# Patient Record
(demographics unavailable — no encounter records)

---

## 2024-10-16 NOTE — CONSULT LETTER
[Dear  ___] : Dear  [unfilled], [Consult Letter:] : I had the pleasure of evaluating your patient, [unfilled]. [Please see my note below.] : Please see my note below. [Sincerely,] : Sincerely, [DrDeloris  ___] : Dr. CARRASCO [FreeTextEntry3] : Petros Pruitt MD FCCP\par  Pulmonary/Critical Care/Sleep Medicine\par  Department of Internal Medicine\par  \par  [facility] [employer]\par

## 2024-10-16 NOTE — PHYSICAL EXAM
[No Acute Distress] : no acute distress [Normal Oropharynx] : normal oropharynx [Normal Appearance] : normal appearance [No Neck Mass] : no neck mass [Normal Rate/Rhythm] : normal rate/rhythm [Normal S1, S2] : normal s1, s2 [No Murmurs] : no murmurs [No Resp Distress] : no resp distress [Kyphosis] : kyphosis [Benign] : benign [Normal Gait] : normal gait [No Clubbing] : no clubbing [No Cyanosis] : no cyanosis [No Edema] : no edema [FROM] : FROM [Normal Color/ Pigmentation] : normal color/ pigmentation [Oriented x3] : oriented x3 [Normal Affect] : normal affect [TextBox_68] : Few expiratory wheezes with decreased breath sounds [TextBox_132] : shuffling gate, tremor

## 2024-10-16 NOTE — HISTORY OF PRESENT ILLNESS
[Mild Persistent] : mild persistent [Doing Well] : doing well [Well Controlled] : Well controlled [Wheezing] : resolved wheezing [Cough] : denies coughing [Shortness Of Breath] : resolved shortness of breath [Chest Tightness Or Heavy Pressure] : denies chest tightness [Feelings Of Weakness On Exertion] : denies reduced exercise tolerance [Adherent] : the patient is adherent with ~his/her~ medication regimen [Chest Pain] : no chest pain or discomfort [Cough at Night] : not awakened at night with cough [Wheezing at Night] : not awakened at night because of wheezing or trouble breathing [de-identified] : with mild  bronchiectasis [de-identified] : PD, sinusitis, Watchman [de-identified] : sinuses cleared, inderal dc'ed

## 2024-10-16 NOTE — DISCUSSION/SUMMARY
[Chronic Rhinosinusitis] : chronic rhinosinusitis [Worsening] : worsening [None] : There are no changes in medication management [Sinus Irrigation] : sinus irrigation [Asthma] : asthma [Stable] : stable [Mild Persistent] : mild persistent [Bronchiectasis] : bronchiectasis [Focal Bronchiectasis] : focal bronchiectasis [Mild] : mild [Unchanged] : unchanged [Responding to Treatment] : responding to treatment [Bronchial Hygiene] : bronchial hygiene [Patient] : discussed with the patient [Medication Changes Per Orders] : Medication changes are as documented in orders [de-identified] : q day [de-identified] : Addition of Inderal [de-identified] : will reduce steroid arm of trelegy [de-identified] : Swallow evaluation for possible aspiration with Parkinson's disease

## 2024-10-16 NOTE — PROCEDURE
[FreeTextEntry1] : Spirometry today demonstrates a forced vital capacity 1.92 L (106%), FEV1 1.15 L (83%).  No change from baseline

## 2024-12-12 NOTE — PHYSICAL EXAM
[FreeTextEntry1] : Speech  hypophonic.  Very mild cogwheeling.No tremors.  No significant bradykinesia. [General Appearance - Alert] : alert [General Appearance - In No Acute Distress] : in no acute distress [General Appearance - Well-Appearing] : healthy appearing [Oriented To Time, Place, And Person] : oriented to person, place, and time [Impaired Insight] : insight and judgment were intact [Affect] : the affect was normal [Over the Past 2 Weeks, Have You Felt Down, Depressed, or Hopeless?] : 1.) Over the past 2 weeks, have you felt down, depressed, or hopeless? No [Over the Past 2 Weeks, Have You Felt Little Interest or Pleasure Doing Things?] : 2.) Over the past 2 weeks, have you felt little interest or pleasure doing things? No [Person] : oriented to person [Place] : oriented to place [Time] : oriented to time [Concentration Intact] : normal concentrating ability [Fluency] : fluency intact [Comprehension] : comprehension intact [Past History] : inadequate knowledge of personal past history [Cranial Nerves Optic (II)] : visual acuity intact bilaterally,  visual fields full to confrontation, pupils equal round and reactive to light [Cranial Nerves Oculomotor (III)] : extraocular motion intact [Cranial Nerves Trigeminal (V)] : facial sensation intact symmetrically [Cranial Nerves Facial (VII)] : face symmetrical [Cranial Nerves Vestibulocochlear (VIII)] : hearing was intact bilaterally [Cranial Nerves Glossopharyngeal (IX)] : tongue and palate midline [Cranial Nerves Accessory (XI - Cranial And Spinal)] : head turning and shoulder shrug symmetric [Cranial Nerves Hypoglossal (XII)] : there was no tongue deviation with protrusion [Motor Tone] : muscle tone was normal in all four extremities [Motor Strength] : muscle strength was normal in all four extremities [Involuntary Movements] : no involuntary movements were seen [No Muscle Atrophy] : normal bulk in all four extremities [Paresis Pronator Drift Right-Sided] : no pronator drift on the right [Paresis Pronator Drift Left-Sided] : no pronator drift on the left [Sensation Tactile Decrease] : light touch was intact [Past-pointing] : there was no past-pointing [Tremor] : no tremor present [Coordination - Dysmetria Impaired Finger-to-Nose Bilateral] : not present [Coordination - Dysmetria Impaired Heel-to-Shin Bilateral] : not present [2+] : Ankle jerk left 2+ [Plantar Reflex Right Only] : normal on the right [Plantar Reflex Left Only] : normal on the left [FreeTextEntry8] : Gait slow and mildly unsteady, uses rolling walker and does well with a walker [PERRL With Normal Accommodation] : pupils were equal in size, round, reactive to light, with normal accommodation [Extraocular Movements] : extraocular movements were intact

## 2024-12-12 NOTE — CONSULT LETTER
[Dear  ___] : Dear  [unfilled], [Consult Letter:] : I had the pleasure of evaluating your patient, [unfilled]. [Please see my note below.] : Please see my note below. [Consult Closing:] : Thank you very much for allowing me to participate in the care of this patient.  If you have any questions, please do not hesitate to contact me. [Sincerely,] : Sincerely, [FreeTextEntry3] : Min Barros MD, PhD, DPN-N\par  [facility]\par  Neurology at [place]\par  Chief, Division of Neurology\par  [facility]\par

## 2024-12-12 NOTE — HISTORY OF PRESENT ILLNESS
[FreeTextEntry1] : I saw her initially 12/23/20 with the following history. She is here with her grandson. She was diagnosed with Parkinson's about 8 years ago. She has been following with Dr. Maritnez but has not seen him in at least 3 years. In fact she stopped all of her Parkinson meds about 3 years ago. She brings bottles with her. She was on amantadine 100 mg twice a day, Neupro 3 mg patch every day, Rytary 23.75-95, 3 times a day. She is here saying she feels weak. Her gait has deteriorated. She has no trouble swallowing. Her speech is quite hypophonic.  Medical history otherwise significant for asthma and hypertension.  I started her on Sinemet 25/100, 3 times a day. She feels she is somewhat improved.  She has remained stable since starting the medication. Denies any falls I had recommended to physical therapy but she said it was too difficult for her to get there so she never went.

## 2024-12-12 NOTE — ASSESSMENT
[FreeTextEntry1] : Parkinsonism.  She will continue Sinemet 25/100 3 times a day. She is aware that this generally can be a progressive condition and medication adjustments may be required in the future.  Followup in 6 months, sooner should the need arise.

## 2025-04-22 NOTE — HISTORY OF PRESENT ILLNESS
[Mild Persistent] : mild persistent [Doing Well] : doing well [Well Controlled] : Well controlled [Cough] : denies coughing [Chest Tightness Or Heavy Pressure] : denies chest tightness [Feelings Of Weakness On Exertion] : denies reduced exercise tolerance [Adherent] : the patient is adherent with ~his/her~ medication regimen [Wheezing] : denies wheezing [Shortness Of Breath] : denies shortness of breath [Chest Pain] : no chest pain or discomfort [Cough at Night] : not awakened at night with cough [Wheezing at Night] : not awakened at night because of wheezing or trouble breathing [Good Control] : peak flow has been poor [de-identified] : with mild  bronchiectasis [de-identified] : PD, sinusitis, Watchman [de-identified] : s/p Hosp at [facility] in Nov  for 48 hrs for asthma

## 2025-04-22 NOTE — DISCUSSION/SUMMARY
[Chronic Rhinosinusitis] : chronic rhinosinusitis [None] : There are no changes in medication management [Sinus Irrigation] : sinus irrigation [Asthma] : asthma [Stable] : stable [Mild Persistent] : mild persistent [Bronchiectasis] : bronchiectasis [Focal Bronchiectasis] : focal bronchiectasis [Mild] : mild [Unchanged] : unchanged [Responding to Treatment] : responding to treatment [Bronchial Hygiene] : bronchial hygiene [Patient] : discussed with the patient [Improving] : improving [de-identified] : q day [de-identified] : Addition of Inderal [de-identified] : Swallow evaluation for possible aspiration with Parkinson's disease

## 2025-04-22 NOTE — CONSULT LETTER
[Dear  ___] : Dear  [unfilled], [Consult Letter:] : I had the pleasure of evaluating your patient, [unfilled]. [Please see my note below.] : Please see my note below. [Sincerely,] : Sincerely, [DrDeloris  ___] : Dr. CARRASCO [FreeTextEntry3] : Petros Pruitt MD FCCP\par  Pulmonary/Critical Care/Sleep Medicine\par  Department of Internal Medicine\par  \par  [facility]\par

## 2025-04-22 NOTE — PROCEDURE
[FreeTextEntry1] : 4/22/2025: Spirometry-FVC 2.44 L (124%), FEV1 1.26 L (92%).  Compared to previous values of 10/16/2024 there is been a significant improvement from 1.92 L and 1.15 L respectively

## 2025-06-12 NOTE — HISTORY OF PRESENT ILLNESS
[FreeTextEntry1] : I saw her initially 12/23/20 with the following history. She is here with her grandson. She was diagnosed with Parkinson's about 8 years ago. She has been following with Dr. Martinez but has not seen him in at least 3 years. In fact she stopped all of her Parkinson meds about 3 years ago. She brings bottles with her. She was on amantadine 100 mg twice a day, Neupro 3 mg patch every day, Rytary 23.75-95, 3 times a day. She is here saying she feels weak. Her gait has deteriorated. She has no trouble swallowing. Her speech is quite hypophonic.  Medical history otherwise significant for asthma and hypertension.  I started her on Sinemet 25/100, 3 times a day. She feels she is somewhat improved.  She has remained stable since starting the medication. Denies any falls I had recommended to physical therapy but she said it was too difficult for her to get there so she never went.

## 2025-06-12 NOTE — PHYSICAL EXAM
[FreeTextEntry1] : Speech  hypophonic.  Very mild cogwheeling.No tremors.  No significant bradykinesia. [General Appearance - Alert] : alert [General Appearance - In No Acute Distress] : in no acute distress [General Appearance - Well-Appearing] : healthy appearing [Oriented To Time, Place, And Person] : oriented to person, place, and time [Impaired Insight] : insight and judgment were intact [Affect] : the affect was normal [Over the Past 2 Weeks, Have You Felt Down, Depressed, or Hopeless?] : 1.) Over the past 2 weeks, have you felt down, depressed, or hopeless? No [Over the Past 2 Weeks, Have You Felt Little Interest or Pleasure Doing Things?] : 2.) Over the past 2 weeks, have you felt little interest or pleasure doing things? No [Person] : oriented to person [Place] : oriented to place [Time] : oriented to time [Concentration Intact] : normal concentrating ability [Fluency] : fluency intact [Comprehension] : comprehension intact [Past History] : inadequate knowledge of personal past history [Cranial Nerves Optic (II)] : visual acuity intact bilaterally,  visual fields full to confrontation, pupils equal round and reactive to light [Cranial Nerves Oculomotor (III)] : extraocular motion intact [Cranial Nerves Trigeminal (V)] : facial sensation intact symmetrically [Cranial Nerves Facial (VII)] : face symmetrical [Cranial Nerves Vestibulocochlear (VIII)] : hearing was intact bilaterally [Cranial Nerves Glossopharyngeal (IX)] : tongue and palate midline [Cranial Nerves Accessory (XI - Cranial And Spinal)] : head turning and shoulder shrug symmetric [Cranial Nerves Hypoglossal (XII)] : there was no tongue deviation with protrusion [Motor Tone] : muscle tone was normal in all four extremities [Involuntary Movements] : no involuntary movements were seen [Motor Strength] : muscle strength was normal in all four extremities [No Muscle Atrophy] : normal bulk in all four extremities [Paresis Pronator Drift Right-Sided] : no pronator drift on the right [Paresis Pronator Drift Left-Sided] : no pronator drift on the left [Sensation Tactile Decrease] : light touch was intact [Past-pointing] : there was no past-pointing [Tremor] : no tremor present [Coordination - Dysmetria Impaired Finger-to-Nose Bilateral] : not present [Coordination - Dysmetria Impaired Heel-to-Shin Bilateral] : not present [2+] : Ankle jerk left 2+ [Plantar Reflex Right Only] : normal on the right [Plantar Reflex Left Only] : normal on the left [FreeTextEntry8] : Gait slow and mildly unsteady, uses rolling walker and does well with a walker [PERRL With Normal Accommodation] : pupils were equal in size, round, reactive to light, with normal accommodation [Extraocular Movements] : extraocular movements were intact

## 2025-06-12 NOTE — CONSULT LETTER
[Dear  ___] : Dear  [unfilled], [Consult Letter:] : I had the pleasure of evaluating your patient, [unfilled]. [Please see my note below.] : Please see my note below. [Consult Closing:] : Thank you very much for allowing me to participate in the care of this patient.  If you have any questions, please do not hesitate to contact me. [Sincerely,] : Sincerely, [FreeTextEntry3] : Min Barros MD, PhD, DPN-N\par  [facility]\par  Neurology at [place]\par  Chief, Division of Neurology\par  [facility]\par

## 2025-06-12 NOTE — END OF VISIT
[Time Spent: ___ minutes] : I have spent [unfilled] minutes of time on the encounter which excludes teaching and separately reported services.
Bloody Fluid